# Patient Record
Sex: FEMALE | Race: WHITE | Employment: FULL TIME | URBAN - METROPOLITAN AREA
[De-identification: names, ages, dates, MRNs, and addresses within clinical notes are randomized per-mention and may not be internally consistent; named-entity substitution may affect disease eponyms.]

---

## 2018-12-12 ENCOUNTER — THERAPY VISIT (OUTPATIENT)
Dept: PHYSICAL THERAPY | Facility: CLINIC | Age: 33
End: 2018-12-12
Payer: COMMERCIAL

## 2018-12-12 DIAGNOSIS — S46.812D STRAIN OF LEFT TRAPEZIUS MUSCLE, SUBSEQUENT ENCOUNTER: Primary | ICD-10-CM

## 2018-12-12 PROCEDURE — 97530 THERAPEUTIC ACTIVITIES: CPT | Mod: GP | Performed by: PHYSICAL THERAPIST

## 2018-12-12 PROCEDURE — 97110 THERAPEUTIC EXERCISES: CPT | Mod: GP | Performed by: PHYSICAL THERAPIST

## 2018-12-12 PROCEDURE — 97161 PT EVAL LOW COMPLEX 20 MIN: CPT | Mod: GP | Performed by: PHYSICAL THERAPIST

## 2018-12-12 NOTE — LETTER
[facility] ATHLETIC [facility] PHYSICAL THER  [address]   [place] [address]  [phone]    2018    Re: Cathleen Murray   :   1985  MRN:  [id number]   REFERRING PHYSICIAN:   Shadia Gimenez MD    [facility] ATHLETIC [facility] PHYSICAL THER  Date of Initial Evaluation: 18  Visits:  Rxs Used: 1  Reason for Referral:  Strain of left trapezius muscle, subsequent encounter    EVALUATION SUMMARY  [facility] Athletic [facility] Initial Evaluation -- Cervical  Evaluation Date: 2018  Cathleen Murray is a 33 year old female with a Lt UT condition.   Referral: Ortho  Work mechanical stresses: /hospitality - standing/repetitive   Employment status: normal hours  Leisure mechanical stresses: not a regular exerciser  Functional disability score (NDI):  See flowsheet  VAS score (0-10): 1/10, ranges 0-4/10  Patient goals/expectations:  exer to be able to control the pain    HISTORY:  Present symptoms:  Tight Lt neck, aching base Lt neck into upper ant chest.  Pain quality (sharp/shooting/stabbing/aching/burning/cramping):   See above.  Paresthesia (yes/no):  no  Present since (onset date): about 6 months ago.  MD referral 2018.     Symptoms (improving/unchanging/worsening):  Slightly better.  Symptoms commenced as a result of: unknown   Condition occurred in the following environment:  unknown  Symptoms at onset (neck/arm/forearm/headache): Lt UT  Constant symptoms (neck/arm/forearm/headache): none  Intermittent symptoms (neck/arm/forearm/headache): Lt neck and UT  Symptoms are made worse with the following: Sometimes Bending, Sometimes Sitting, time of day - Always as the day progresses and reaching/lifting overhead or reaching across  Symptoms are made better with the following: sitting straight and stretching out arm  Disturbed sleep (yes/no): Occas difficult finding comfortable position after working  Number of pillows:   Sleeping postures  (prone/sup/side R/L):   0Previous episodes (0/1-5/6-10/11+): o Year of first episode:   Previous history:   Previous treatments: Massage, cupping, acupuncture,           Re: Cathleen Trevor   :   1985    Specific Questions: (as reported by the patient)  Dizziness/Tinnitus/Nausea/Swallowing (pos/neg): neg  Gait/Upper Limbs (normal/abnormal): normal  Medications (nil/NSAIDS/anlag/steroids/anticoag/other):  None  Medical allergies:  PCN  General health (excellent/good/fair/poor):  good  Pertinent medical history:  None  Imaging (None/Xray/MRI/Other):  none  Recent or major surgery (yes/no): none  Night pain (yes/no): none  Accidents (yes/no): no  Unexplained weight loss (yes/no): no  Barriers at home: none  Other red flags: none    EXAMINATION  Posture:   Sitting (good/fair/poor): fair  Standing (good/fair/poor): fair - normal lordosis   Protruded head (yes/no): yes    Wry Neck (right/left/nil):  nil  Relevant (yes/no):     Correction of posture(better/worse/no effect): asher  Other observations:      Neurological:  Motor Deficit:  Decr Triceps 4/5 and Wrist ext 4+/5 Lt   Reflexes:  na  Sensory Deficit:  na   Dural signs:  na    Movement Loss:   [place] Mod Min Nil Pain   Protrusion    X    Flexion    X    Retraction   X  Lt neck   Extension   X  Slight Lt neck   Lateral flexion R    X Stretching Lt neck   Lateral flexion L    X    Rotation R   X  Lt neck stretch   Rotation L    X      Test Movements:   During: produces, abolishes, increases, decreases, no effect, centralizing, peripheralizing  After: better, worse, no better, no worse, no effect, centralized, peripheralized    Pretest symptoms sitting: mild ache Lt UT/neck   Symptoms During Symptoms After ROM increased ROM decreased No Effect   PRO Produces    No Worse         Rep PRO Increases  bilat UT    No Worse      Incr discomfort Lt w/ rotations   RET Increases    No Worse         Rep Ret Incre Lt Neck NW   X   Retr w/ OP incr whole neck NW      Rep RET w/ OP  Increases  Whole neck    No Worse    X  Pain more post neck     RET EXT        Rep RET EXT            Static Tests:   Protrusion:  Lt neck pain  Flexion:  na  Retraction:  decr neck pain  Extension (sitting/prone/supine):  na    Other Tests:    Shld ROM WNL, slight pain active abduction    Provisional Classification:  Derangement - Asymmetrical, unilateral, symptoms above elbow    Principle of Management:  Education:  Posture:  Neutral spine, Use of L-roll, affect of posture on spine and pain producing structures, no couch or recliner, centralisation vs peripheralisation, relastionship betwn neck and shld, and HEP    Equipment provided:  none  Mechanical therapy (Y/N):  y   Extension principle:  Seated Cerv Retraction w/ OP 10x 6 x/day   Lateral principle:    Flexion principle:     Other:      ASSESSMENT/PLAN:    Patient is a 33 year old female with cervical complaints.    Patient has the following significant findings with corresponding treatment plan.                Diagnosis 1:  Lt Trap Strain  Pain -  manual therapy, education, directional preference exercise and home program  Decreased ROM/flexibility - manual therapy, therapeutic exercise and home program  Decreased strength - therapeutic exercise, therapeutic activities and home program  Decreased function - therapeutic activities and home program  Impaired posture - neuro re-education and home program    Therapy Evaluation Codes:   1) History comprised of:   Personal factors that impact the plan of care:      None.    Comorbidity factors that impact the plan of care are:      None.     Medications impacting care: None.    Re: Cathleen Murray   :   1985    2) Examination of Body Systems comprised of:   Body structures and functions that impact the plan of care:      Cervical spine.   Activity limitations that impact the plan of care are:      Cooking, Sitting, Working and reaching.  3) Clinical presentation characteristics  are:   Stable/Uncomplicated.  4) Decision-Making    Low complexity using standardized patient assessment instrument and/or measureable assessment of functional outcome.  Cumulative Therapy Evaluation is: Low complexity.    Previous and current functional limitations:  (See Goal Flow Sheet for this information)    Short term and Long term goals: (See Goal Flow Sheet for this information)   Communication ability:  Patient appears to be able to clearly communicate and understand verbal and written communication and follow directions correctly.  Treatment Explanation - The following has been discussed with the patient:   RX ordered/plan of care  Anticipated outcomes  Possible risks and side effects  This patient would benefit from PT intervention to resume normal activities.   Rehab potential is good.  Frequency:  1 X week, once daily  Duration:  for 6 weeks  Discharge Plan:  Achieve all LTG.  Independent in home treatment program.  Reach maximal therapeutic benefit.      Thank you for your referral.    INQUIRIES  Therapist: Lissette Cerrato PT  [facility]  [address] [facility] [address]  [place] [address]  Phone: [phone]  Fax: [phone]

## 2018-12-12 NOTE — PROGRESS NOTES
[facility] Initial Evaluation -- Cervical    Evaluation Date: December 12, 2018  Cathleen Murray is a 33 year old female with a Lt UT condition.   Referral: Ortho  Work mechanical stresses: /hospitality - standing/repetitive   Employment status: normal hours  Leisure mechanical stresses: not a regular exerciser  Functional disability score (NDI):  See flowsheet  VAS score (0-10): 1/10, ranges 0-4/10  Patient goals/expectations:  exer to be able to control the pain    HISTORY:    Present symptoms:  Tight Lt neck, aching base Lt neck into upper ant chest.  Pain quality (sharp/shooting/stabbing/aching/burning/cramping):   See above.  Paresthesia (yes/no):  no    Present since (onset date): about 6 months ago.  MD referral 12-6-2018.     Symptoms (improving/unchanging/worsening):  Slightly better.    Symptoms commenced as a result of: unknown   Condition occurred in the following environment:  unknown    Symptoms at onset (neck/arm/forearm/headache): Lt UT  Constant symptoms (neck/arm/forearm/headache): none  Intermittent symptoms (neck/arm/forearm/headache): Lt neck and UT    Symptoms are made worse with the following: Sometimes Bending, Sometimes Sitting, time of day - Always as the day progresses and reaching/lifting overhead or reaching across  Symptoms are made better with the following: sitting straight and stretching out arm    Disturbed sleep (yes/no): Occas difficult finding comfortable position after working  Number of pillows:   Sleeping postures (prone/sup/side R/L):     0Previous episodes (0/1-5/6-10/11+): o Year of first episode:     Previous history:   Previous treatments: Massage, cupping, acupuncture,     Specific Questions: (as reported by the patient)  Dizziness/Tinnitus/Nausea/Swallowing (pos/neg): neg  Gait/Upper Limbs (normal/abnormal): normal  Medications (nil/NSAIDS/anlag/steroids/anticoag/other):  None  Medical allergies:  PCN  General health (excellent/good/fair/poor):   good  Pertinent medical history:  None  Imaging (None/Xray/MRI/Other):  none  Recent or major surgery (yes/no): none  Night pain (yes/no): none  Accidents (yes/no): no  Unexplained weight loss (yes/no): no  Barriers at home: none  Other red flags: none    EXAMINATION    Posture:   Sitting (good/fair/poor): fair  Standing (good/fair/poor): fair - normal lordosis     Protruded head (yes/no): yes    Wry Neck (right/left/nil):  nil  Relevant (yes/no):       Correction of posture(better/worse/no effect): asher  Other observations:      Neurological:    Motor Deficit:  Decr Triceps 4/5 and Wrist ext 4+/5 Lt   Reflexes:  na  Sensory Deficit:  na   Dural signs:  na    Movement Loss:   [place] Mod Min Nil Pain   Protrusion    X    Flexion    X    Retraction   X  Lt neck   Extension   X  Slight Lt neck   Lateral flexion R    X Stretching Lt neck   Lateral flexion L    X    Rotation R   X  Lt neck stretch   Rotation L    X      Test Movements:   During: produces, abolishes, increases, decreases, no effect, centralizing, peripheralizing  After: better, worse, no better, no worse, no effect, centralized, peripheralized    Pretest symptoms sitting: mild ache Lt UT/neck   Symptoms During Symptoms After ROM increased ROM decreased No Effect   PRO Produces    No Worse         Rep PRO Increases  bilat UT    No Worse      Incr discomfort Lt w/ rotations   RET Increases    No Worse         Rep Ret Incre Lt Neck NW   X   Retr w/ OP incr whole neck NW      Rep RET w/ OP Increases  Whole neck    No Worse    X  Pain more post neck     RET EXT        Rep RET EXT            Static Tests:   Protrusion:  Lt neck pain  Flexion:  na  Retraction:  decr neck pain  Extension (sitting/prone/supine):  na    Other Tests:    Shld ROM WNL, slight pain active abduction    Provisional Classification:  Derangement - Asymmetrical, unilateral, symptoms above elbow    Principle of Management:  Education:  Posture:  Neutral spine, Use of L-roll, affect of posture  on spine and pain producing structures, no couch or recliner, centralisation vs peripheralisation, relastionship betwn neck and shld, and HEP    Equipment provided:  none  Mechanical therapy (Y/N):  y   Extension principle:  Seated Cerv Retraction w/ OP 10x 6 x/day   Lateral principle:    Flexion principle:     Other:      ASSESSMENT/PLAN:    Patient is a 33 year old female with cervical complaints.    Patient has the following significant findings with corresponding treatment plan.                Diagnosis 1:  Lt Trap Strain  Pain -  manual therapy, education, directional preference exercise and home program  Decreased ROM/flexibility - manual therapy, therapeutic exercise and home program  Decreased strength - therapeutic exercise, therapeutic activities and home program  Decreased function - therapeutic activities and home program  Impaired posture - neuro re-education and home program    Therapy Evaluation Codes:   1) History comprised of:   Personal factors that impact the plan of care:      None.    Comorbidity factors that impact the plan of care are:      None.     Medications impacting care: None.  2) Examination of Body Systems comprised of:   Body structures and functions that impact the plan of care:      Cervical spine.   Activity limitations that impact the plan of care are:      Cooking, Sitting, Working and reaching.  3) Clinical presentation characteristics are:   Stable/Uncomplicated.  4) Decision-Making    Low complexity using standardized patient assessment instrument and/or measureable assessment of functional outcome.  Cumulative Therapy Evaluation is: Low complexity.    Previous and current functional limitations:  (See Goal Flow Sheet for this information)    Short term and Long term goals: (See Goal Flow Sheet for this information)     Communication ability:  Patient appears to be able to clearly communicate and understand verbal and written communication and follow directions  correctly.  Treatment Explanation - The following has been discussed with the patient:   RX ordered/plan of care  Anticipated outcomes  Possible risks and side effects  This patient would benefit from PT intervention to resume normal activities.   Rehab potential is good.    Frequency:  1 X week, once daily  Duration:  for 6 weeks  Discharge Plan:  Achieve all LTG.  Independent in home treatment program.  Reach maximal therapeutic benefit.    Please refer to the daily flowsheet for treatment today, total treatment time and time spent performing 1:1 timed codes.

## 2018-12-17 ENCOUNTER — THERAPY VISIT (OUTPATIENT)
Dept: PHYSICAL THERAPY | Facility: CLINIC | Age: 33
End: 2018-12-17
Payer: COMMERCIAL

## 2018-12-17 DIAGNOSIS — M54.2 NECK PAIN: Primary | ICD-10-CM

## 2018-12-17 PROCEDURE — 97530 THERAPEUTIC ACTIVITIES: CPT | Mod: GP | Performed by: PHYSICAL THERAPY ASSISTANT

## 2018-12-17 PROCEDURE — 97110 THERAPEUTIC EXERCISES: CPT | Mod: GP | Performed by: PHYSICAL THERAPY ASSISTANT

## 2019-09-18 ENCOUNTER — OFFICE VISIT (OUTPATIENT)
Dept: INTERNAL MEDICINE | Facility: CLINIC | Age: 34
End: 2019-09-18
Attending: INTERNAL MEDICINE
Payer: COMMERCIAL

## 2019-09-18 VITALS
BODY MASS INDEX: 32.24 KG/M2 | HEART RATE: 73 BPM | WEIGHT: 217.7 LBS | SYSTOLIC BLOOD PRESSURE: 139 MMHG | HEIGHT: 69 IN | DIASTOLIC BLOOD PRESSURE: 100 MMHG

## 2019-09-18 DIAGNOSIS — F41.1 GENERALIZED ANXIETY DISORDER: ICD-10-CM

## 2019-09-18 DIAGNOSIS — Z11.3 SCREENING EXAMINATION FOR SEXUALLY TRANSMITTED DISEASE: ICD-10-CM

## 2019-09-18 DIAGNOSIS — Z23 NEED FOR TDAP VACCINATION: ICD-10-CM

## 2019-09-18 DIAGNOSIS — M72.2 PLANTAR FASCIITIS: ICD-10-CM

## 2019-09-18 DIAGNOSIS — F41.9 ANXIETY: Primary | ICD-10-CM

## 2019-09-18 LAB
ERYTHROCYTE [DISTWIDTH] IN BLOOD BY AUTOMATED COUNT: 12.5 % (ref 10–15)
FERRITIN SERPL-MCNC: 58 NG/ML (ref 12–150)
HCT VFR BLD AUTO: 44.1 % (ref 35–47)
HGB BLD-MCNC: 14.5 G/DL (ref 11.7–15.7)
MCH RBC QN AUTO: 29.4 PG (ref 26.5–33)
MCHC RBC AUTO-ENTMCNC: 32.9 G/DL (ref 31.5–36.5)
MCV RBC AUTO: 90 FL (ref 78–100)
PLATELET # BLD AUTO: 314 10E9/L (ref 150–450)
RBC # BLD AUTO: 4.93 10E12/L (ref 3.8–5.2)
TSH SERPL DL<=0.005 MIU/L-ACNC: 0.67 MU/L (ref 0.4–4)
WBC # BLD AUTO: 7.6 10E9/L (ref 4–11)

## 2019-09-18 PROCEDURE — 82728 ASSAY OF FERRITIN: CPT | Performed by: INTERNAL MEDICINE

## 2019-09-18 PROCEDURE — 85027 COMPLETE CBC AUTOMATED: CPT | Performed by: INTERNAL MEDICINE

## 2019-09-18 PROCEDURE — 90686 IIV4 VACC NO PRSV 0.5 ML IM: CPT | Mod: ZF

## 2019-09-18 PROCEDURE — 87389 HIV-1 AG W/HIV-1&-2 AB AG IA: CPT | Performed by: INTERNAL MEDICINE

## 2019-09-18 PROCEDURE — G0463 HOSPITAL OUTPT CLINIC VISIT: HCPCS | Mod: 25,ZF

## 2019-09-18 PROCEDURE — 90471 IMMUNIZATION ADMIN: CPT | Mod: ZF

## 2019-09-18 PROCEDURE — 90715 TDAP VACCINE 7 YRS/> IM: CPT | Mod: ZF

## 2019-09-18 PROCEDURE — 36415 COLL VENOUS BLD VENIPUNCTURE: CPT | Performed by: INTERNAL MEDICINE

## 2019-09-18 PROCEDURE — 84443 ASSAY THYROID STIM HORMONE: CPT | Performed by: INTERNAL MEDICINE

## 2019-09-18 PROCEDURE — G0008 ADMIN INFLUENZA VIRUS VAC: HCPCS | Mod: ZF

## 2019-09-18 PROCEDURE — 25000128 H RX IP 250 OP 636: Mod: ZF

## 2019-09-18 RX ORDER — HYDROXYZINE HYDROCHLORIDE 10 MG/1
10 TABLET, FILM COATED ORAL 3 TIMES DAILY PRN
Qty: 30 TABLET | Refills: 3 | Status: SHIPPED | OUTPATIENT
Start: 2019-09-18

## 2019-09-18 ASSESSMENT — ANXIETY QUESTIONNAIRES
1. FEELING NERVOUS, ANXIOUS, OR ON EDGE: NEARLY EVERY DAY
7. FEELING AFRAID AS IF SOMETHING AWFUL MIGHT HAPPEN: NOT AT ALL
5. BEING SO RESTLESS THAT IT IS HARD TO SIT STILL: NOT AT ALL
6. BECOMING EASILY ANNOYED OR IRRITABLE: MORE THAN HALF THE DAYS
GAD7 TOTAL SCORE: 10
2. NOT BEING ABLE TO STOP OR CONTROL WORRYING: SEVERAL DAYS
3. WORRYING TOO MUCH ABOUT DIFFERENT THINGS: MORE THAN HALF THE DAYS

## 2019-09-18 ASSESSMENT — PATIENT HEALTH QUESTIONNAIRE - PHQ9
SUM OF ALL RESPONSES TO PHQ QUESTIONS 1-9: 9
5. POOR APPETITE OR OVEREATING: MORE THAN HALF THE DAYS

## 2019-09-18 ASSESSMENT — MIFFLIN-ST. JEOR: SCORE: 1756.86

## 2019-09-18 NOTE — LETTER
9/25/2019         Cathleen Murray   [address] [facility] [address]        Dear Ms. Murray:    Your labs were reviewed by Ashley Padgett MD and are within acceptable ranges.  No changes are recommended      Results for orders placed or performed in visit on 09/18/19   TSH with free T4 reflex   Result Value Ref Range    TSH 0.67 0.40 - 4.00 mU/L   HIV Antigen Antibody Combo   Result Value Ref Range    HIV Antigen Antibody Combo Nonreactive NR^Nonreactive       CBC with Platelets   Result Value Ref Range    WBC 7.6 4.0 - 11.0 10e9/L    RBC Count 4.93 3.8 - 5.2 10e12/L    Hemoglobin 14.5 11.7 - 15.7 g/dL    Hematocrit 44.1 35.0 - 47.0 %    MCV 90 78 - 100 fl    MCH 29.4 26.5 - 33.0 pg    MCHC 32.9 31.5 - 36.5 g/dL    RDW 12.5 10.0 - 15.0 %    Platelet Count 314 150 - 450 10e9/L   Ferritin   Result Value Ref Range    Ferritin 58 12 - 150 ng/mL         Please note that test explanations are brief and do not reflect all diagnostic uses.  If you have any questions or concerns, please call the clinic at [phone].      Sincerely,      Radha Leonard LPN sent on behalf of  Ashley Padgett MD

## 2019-09-18 NOTE — PROGRESS NOTES
"Office Visit    CC: Anxiety and diarrhoea    HPI:  Moved to [place] 7 yrs ago, previously on medication for anxiety for 2 years-buspirone (was fine, not amazing). Tried another (name unknown) which was good but insurance didn't cover. Doesn't remember other one tried. Previously acupuncture but really got bad 2 weeks ago. She has a lot of social anxiety, claustrophobic. Some dissociation while working due to anxiety. Sometimes panicky and get heart palpitations. Trying CBD oil as well, but anxiety has reached past the point of improvement with conservative measures.  Works as a  at [employer]. Has had to miss four days of work due to anxiety, which she says is \"not acceptable\".  No SI/HI.      In regards to the diarrhea, it is generally only in the morning, some urgency.  No incontinence, no saddle anaesthesia.   Bread ok, but anything fatty, fruit/veg causes problems. 2 years.  She just started probiotics, feeling better.    Camping-yes. No bloating or gas  No signs of malabsorption, such as weight loss, foul smelling stools that are difficult to flush  Travel in last two years- [place]  Blood in stool-no  Melaena-no  Sick contacts-no  Fever -no  Cramping-no  No FHx Crohns or UC, no FHx autoimmune dz other than T1DM  No nausea or vomiting  Every day, would be surprised if doesn't have  No changes with emotions/anxiety  No constipation at other times of day, other BMs mostly normal  Abdo pain-occasional cramping on bad days    LMP-two weeks ago. IUD--needs replaced (year 10) copper. 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 Dad has it, she was tested.    Some swelling ankles, worse after shift as . Tender. Occ dizziness emiliano at work, emiliano if hasn't eaten    PMH-anxiety.  Past surgical history: [place] teeth extraction, no problems with anaesthesia    FHx: Both types DM in family      Tdap as teenager, tetanus in college > 10 years ago    Abnormal pap smear >10 years, never had colposcopy or " "treatment, next one normal (recheck in six months). Has copper IUD for contraception. Menses without excessive bleeding or pain.    Had gardasil in 20s    Wears good shoes. Has plantar fasciitis which has been getting worse despite OTC inserts.        O/E:  BP (!) 139/100   Pulse 73   Ht 1.753 m (5' 9\")   Wt 98.7 kg (217 lb 11.2 oz)   BMI 32.15 kg/m    Exam:  Constitutional: healthy, alert and no distress. Occasionally tearful  Head: Normocephalic. No masses, lesions, tenderness or abnormalities  Neck: Neck supple. No adenopathy. Thyroid symmetric, normal size, Carotids without bruits.  Cardiovascular: negative. RRR. No murmurs, clicks gallops or rub, No oedema or JVD.  Respiratory: negative, Percussion normal. Good diaphragmatic excursion. Lungs clear  Gastrointestinal: Abdomen soft, non-tender. BS normal. No masses, organomegaly  : Deferred  Musculoskeletal: extremities normal- no gross deformities noted, gait normal and normal muscle tone  Skin: no suspicious lesions or rashes  Neurologic: Gait normal. Speech fluent, answering questions appropriately  Psychiatric: mentation appears normal and affect normal/bright  Hematologic/Lymphatic/Immunologic: Normal cervical lymph nodes        Assessment and Plan:    #. Anxiety and depression  -TSH  -Psychology referral  -Fluoxetine 20mg  - Hydroxyzine PRN    #. RHM  - Influenza vaccination  - Tdap  - HIV  - CBC and ferritin    #. Womens health  - Return for pap smear and removal/re-insertion of IUD (to schedule with gynecology)    #. Plantar fasciitis  - Podiatry referral      The patient was seen and discussed with the attending physician, Dr. Padgett, who agrees with my assessment and plan.    Amber MALDONADO  Internal Medicine PGY3    I was present during the visit and the patient was seen and examined by me in conjunction with the resident.  I discussed the pertinent history, exam, results and plan with the resident and agree with the documentation above with " the following exceptions: none.    Ashley Padgett MD

## 2019-09-18 NOTE — LETTER
"9/18/2019       RE: Cathleen Murray  [address] [facility] [address]     Dear Colleague,    Thank you for referring your patient, Cathleen Murray, to [facility]  at [facility]. Please see a copy of my visit note below.    Office Visit    CC: Anxiety and diarrhoea    HPI:  Moved to [place] 7 yrs ago, previously on medication for anxiety for 2 years-buspirone (was fine, not amazing). Tried another (name unknown) which was good but insurance didn't cover. Doesn't remember other one tried. Previously acupuncture but really got bad 2 weeks ago. She has a lot of social anxiety, claustrophobic. Some dissociation while working due to anxiety. Sometimes panicky and get heart palpitations. Trying CBD oil as well, but anxiety has reached past the point of improvement with conservative measures.  Works as a  at [employer]. Has had to miss four days of work due to anxiety, which she says is \"not acceptable\".  No SI/HI.    In regards to the diarrhea, it is generally only in the morning, some urgency.  No incontinence, no saddle anaesthesia.   Bread ok, but anything fatty, fruit/veg causes problems. 2 years.  She just started probiotics, feeling better.    Camping-yes. No bloating or gas  No signs of malabsorption, such as weight loss, foul smelling stools that are difficult to flush  Travel in last two years- [place]  Blood in stool-no  Melaena-no  Sick contacts-no  Fever -no  Cramping-no  No FHx Crohns or UC, no FHx autoimmune dz other than T1DM  No nausea or vomiting  Every day, would be surprised if doesn't have  No changes with emotions/anxiety  No constipation at other times of day, other BMs mostly normal  Abdo pain-occasional cramping on bad days    LMP-two weeks ago. IUD--needs replaced (year 10) copper. 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 Dad has it, she was tested.    Some swelling ankles, worse after shift as . Tender. Occ " "dizziness emiliano at work, emiliano if hasn't eaten    PMH-anxiety.  Past surgical history: [place] teeth extraction, no problems with anaesthesia    FHx: Both types DM in family    Tdap as teenager, tetanus in college > 10 years ago    Abnormal pap smear >10 years, never had colposcopy or treatment, next one normal (recheck in six months). Has copper IUD for contraception. Menses without excessive bleeding or pain.    Had gardasil in 20s    Wears good shoes. Has plantar fasciitis which has been getting worse despite OTC inserts.    O/E:  BP (!) 139/100   Pulse 73   Ht 1.753 m (5' 9\")   Wt 98.7 kg (217 lb 11.2 oz)   BMI 32.15 kg/m     Exam:  Constitutional: healthy, alert and no distress. Occasionally tearful  Head: Normocephalic. No masses, lesions, tenderness or abnormalities  Neck: Neck supple. No adenopathy. Thyroid symmetric, normal size, Carotids without bruits.  Cardiovascular: negative. RRR. No murmurs, clicks gallops or rub, No oedema or JVD.  Respiratory: negative, Percussion normal. Good diaphragmatic excursion. Lungs clear  Gastrointestinal: Abdomen soft, non-tender. BS normal. No masses, organomegaly  : Deferred  Musculoskeletal: extremities normal- no gross deformities noted, gait normal and normal muscle tone  Skin: no suspicious lesions or rashes  Neurologic: Gait normal. Speech fluent, answering questions appropriately  Psychiatric: mentation appears normal and affect normal/bright  Hematologic/Lymphatic/Immunologic: Normal cervical lymph nodes    Assessment and Plan:    #. Anxiety and depression  -TSH  -Psychology referral  -Fluoxetine 20mg  - Hydroxyzine PRN    #. RHM  - Influenza vaccination  - Tdap  - HIV  - CBC and ferritin    #. Womens health  - Return for pap smear and removal/re-insertion of IUD (to schedule with gynecology)    #. Plantar fasciitis  - Podiatry referral    The patient was seen and discussed with the attending physician, Dr. Padgett, who agrees with my assessment and " plan.    Amber MALDONADO  Internal Medicine PGY3    I was present during the visit and the patient was seen and examined by me in conjunction with the resident.  I discussed the pertinent history, exam, results and plan with the resident and agree with the documentation above with the following exceptions: none.    Ashley Padgett MD

## 2019-09-18 NOTE — LETTER
9/25/2019         Cathleen Murray   [address] [facility] [address]        Dear Ms. Murray:    Your labs were reviewed by Ashley Padgett MD and are within acceptable ranges.  No changes are recommended.    Results for orders placed or performed in visit on 09/18/19   TSH with free T4 reflex   Result Value Ref Range    TSH 0.67 0.40 - 4.00 mU/L   HIV Antigen Antibody Combo   Result Value Ref Range    HIV Antigen Antibody Combo Nonreactive NR^Nonreactive       CBC with Platelets   Result Value Ref Range    WBC 7.6 4.0 - 11.0 10e9/L    RBC Count 4.93 3.8 - 5.2 10e12/L    Hemoglobin 14.5 11.7 - 15.7 g/dL    Hematocrit 44.1 35.0 - 47.0 %    MCV 90 78 - 100 fl    MCH 29.4 26.5 - 33.0 pg    MCHC 32.9 31.5 - 36.5 g/dL    RDW 12.5 10.0 - 15.0 %    Platelet Count 314 150 - 450 10e9/L   Ferritin   Result Value Ref Range    Ferritin 58 12 - 150 ng/mL         Please note that test explanations are brief and do not reflect all diagnostic uses.  If you have any questions or concerns, please call the clinic at [phone].      Sincerely,      Radha Leonard LPN sent on behalf of  Ashley Padgett MD

## 2019-09-19 LAB — HIV 1+2 AB+HIV1 P24 AG SERPL QL IA: NONREACTIVE

## 2019-09-19 ASSESSMENT — ANXIETY QUESTIONNAIRES: GAD7 TOTAL SCORE: 10

## 2019-10-30 ENCOUNTER — OFFICE VISIT (OUTPATIENT)
Dept: INTERNAL MEDICINE | Facility: CLINIC | Age: 34
End: 2019-10-30
Attending: INTERNAL MEDICINE
Payer: COMMERCIAL

## 2019-10-30 VITALS
BODY MASS INDEX: 31.75 KG/M2 | WEIGHT: 215 LBS | HEART RATE: 80 BPM | DIASTOLIC BLOOD PRESSURE: 112 MMHG | SYSTOLIC BLOOD PRESSURE: 146 MMHG

## 2019-10-30 DIAGNOSIS — R03.0 ELEVATED BLOOD PRESSURE READING WITHOUT DIAGNOSIS OF HYPERTENSION: Primary | ICD-10-CM

## 2019-10-30 DIAGNOSIS — F41.1 GENERALIZED ANXIETY DISORDER: ICD-10-CM

## 2019-10-30 PROCEDURE — G0463 HOSPITAL OUTPT CLINIC VISIT: HCPCS | Mod: ZF

## 2019-10-30 ASSESSMENT — PAIN SCALES - GENERAL: PAINLEVEL: NO PAIN (0)

## 2019-10-30 ASSESSMENT — PATIENT HEALTH QUESTIONNAIRE - PHQ9
SUM OF ALL RESPONSES TO PHQ QUESTIONS 1-9: 10
5. POOR APPETITE OR OVEREATING: SEVERAL DAYS

## 2019-10-30 ASSESSMENT — ANXIETY QUESTIONNAIRES
3. WORRYING TOO MUCH ABOUT DIFFERENT THINGS: SEVERAL DAYS
7. FEELING AFRAID AS IF SOMETHING AWFUL MIGHT HAPPEN: NOT AT ALL
6. BECOMING EASILY ANNOYED OR IRRITABLE: SEVERAL DAYS
5. BEING SO RESTLESS THAT IT IS HARD TO SIT STILL: NOT AT ALL
2. NOT BEING ABLE TO STOP OR CONTROL WORRYING: SEVERAL DAYS
1. FEELING NERVOUS, ANXIOUS, OR ON EDGE: SEVERAL DAYS
GAD7 TOTAL SCORE: 5

## 2019-10-30 NOTE — PROGRESS NOTES
HPI  Patient is here for follow-up on anxiety and depression.  She reports that her mood has improved.  She has not establish care with psychology this time, however, she is still looking for a therapist.    Review of Systems     Constitutional:  Negative for fever and fatigue.   Respiratory:   Negative for cough and dyspnea on exertion.    Cardiovascular:  Negative for chest pain, dyspnea on exertion and edema.   Gastrointestinal:  Negative for nausea, abdominal pain, diarrhea, constipation and melena.   Skin:  Negative for itching and rash.   Endo/Heme:  Negative for anemia, swollen glands and bruises/bleeds easily.   Psychiatric/Behavioral:  Positive for depression and mood swings. Negative for decreased concentration and panic attacks.    Endocrine:  Negative for altered temperature regulation, polyphagia, polydipsia, unwanted hair growth and change in facial hair.    Current Outpatient Medications   Medication     FLUoxetine (PROZAC) 20 MG capsule     hydrOXYzine (ATARAX) 10 MG tablet     No current facility-administered medications for this visit.      Vitals:    10/30/19 1357 10/30/19 1358 10/30/19 1359 10/30/19 1400   BP: (!) 152/115 (!) 145/110 (!) 142/111 (!) 146/112   Pulse: 80 80 80    Weight: 97.5 kg (215 lb)          Physical Exam  Vitals signs and nursing note reviewed.   Constitutional:       Appearance: Normal appearance.   HENT:      Head: Normocephalic and atraumatic.      Mouth/Throat:      Mouth: Mucous membranes are moist.   Eyes:      Pupils: Pupils are equal, round, and reactive to light.   Neck:      Musculoskeletal: Normal range of motion.   Cardiovascular:      Rate and Rhythm: Normal rate.   Pulmonary:      Effort: Pulmonary effort is normal.   Musculoskeletal:         General: No edema.   Neurological:      General: No focal deficit present.      Mental Status: She is alert and oriented to person, place, and time.   Psychiatric:         Mood and Affect: Mood normal.         Behavior:  Behavior normal.         Thought Content: Thought content normal.         Judgement: Judgment normal.         Assessment and Plan:  Cathleen was seen today for follow up.    Diagnoses and all orders for this visit:    Elevated blood pressure reading without diagnosis of hypertension.  Blood pressure is significantly elevated today.  Recommend further evaluation with 24-hour blood pressure study as patient may have whitecoat hypertension.  Patient will follow-up upon completion of the study for discussion and possible initiation of therapy.  -     24 Hour Blood Pressure Monitor - Adult; Future      Generalized anxiety disorder.  Patient reports that her symptoms have improved.  Recommend to continue with current therapy without changes.  Patient was also encouraged to consider counseling.    Total time spent 25 minutes.  More than 50% of the time spent with Ms. Murray on counseling / coordinating her care    Ashley Padgett MD

## 2019-10-30 NOTE — LETTER
10/30/2019       RE: Cathleen Murray  [address] [facility] [address]     Dear Colleague,    Thank you for referring your patient, Cathleen Murray, to [facility] Please see a copy of my visit note below.    HPI  Patient is here for follow-up on anxiety and depression.  She reports that her mood has improved.  She has not establish care with psychology this time, however, she is still looking for a therapist.    Review of Systems     Constitutional:  Negative for fever and fatigue.   Respiratory:   Negative for cough and dyspnea on exertion.    Cardiovascular:  Negative for chest pain, dyspnea on exertion and edema.   Gastrointestinal:  Negative for nausea, abdominal pain, diarrhea, constipation and melena.   Skin:  Negative for itching and rash.   Endo/Heme:  Negative for anemia, swollen glands and bruises/bleeds easily.   Psychiatric/Behavioral:  Positive for depression and mood swings. Negative for decreased concentration and panic attacks.    Endocrine:  Negative for altered temperature regulation, polyphagia, polydipsia, unwanted hair growth and change in facial hair.    Current Outpatient Medications   Medication     FLUoxetine (PROZAC) 20 MG capsule     hydrOXYzine (ATARAX) 10 MG tablet     No current facility-administered medications for this visit.      Vitals:    10/30/19 1357 10/30/19 1358 10/30/19 1359 10/30/19 1400   BP: (!) 152/115 (!) 145/110 (!) 142/111 (!) 146/112   Pulse: 80 80 80    Weight: 97.5 kg (215 lb)          Physical Exam  Vitals signs and nursing note reviewed.   Constitutional:       Appearance: Normal appearance.   HENT:      Head: Normocephalic and atraumatic.      Mouth/Throat:      Mouth: Mucous membranes are moist.   Eyes:      Pupils: Pupils are equal, round, and reactive to light.   Neck:      Musculoskeletal: Normal range of motion.   Cardiovascular:      Rate and Rhythm: Normal rate.   Pulmonary:       Effort: Pulmonary effort is normal.   Musculoskeletal:         General: No edema.   Neurological:      General: No focal deficit present.      Mental Status: She is alert and oriented to person, place, and time.   Psychiatric:         Mood and Affect: Mood normal.         Behavior: Behavior normal.         Thought Content: Thought content normal.         Judgement: Judgment normal.         Assessment and Plan:  Cathleen was seen today for follow up.    Diagnoses and all orders for this visit:    Elevated blood pressure reading without diagnosis of hypertension.  Blood pressure is significantly elevated today.  Recommend further evaluation with 24-hour blood pressure study as patient may have whitecoat hypertension.  Patient will follow-up upon completion of the study for discussion and possible initiation of therapy.  -     24 Hour Blood Pressure Monitor - Adult; Future      Generalized anxiety disorder.  Patient reports that her symptoms have improved.  Recommend to continue with current therapy without changes.  Patient was also encouraged to consider counseling.    Total time spent 25 minutes.  More than 50% of the time spent with Ms. Murray on counseling / coordinating her care    Ashley Padgett MD

## 2019-10-31 ASSESSMENT — ANXIETY QUESTIONNAIRES: GAD7 TOTAL SCORE: 5

## 2019-11-02 PROBLEM — F41.1 GENERALIZED ANXIETY DISORDER: Status: ACTIVE | Noted: 2019-11-02

## 2019-11-02 ASSESSMENT — ENCOUNTER SYMPTOMS
DEPRESSION: 1
DIARRHEA: 0
CONSTIPATION: 0
ALTERED TEMPERATURE REGULATION: 0
NAUSEA: 0
FEVER: 0
BRUISES/BLEEDS EASILY: 0
NERVOUS/ANXIOUS: 1
DYSPNEA ON EXERTION: 0
PANIC: 0
INSOMNIA: 0
SWOLLEN GLANDS: 0
DECREASED CONCENTRATION: 0
COUGH: 0
FATIGUE: 0
POLYPHAGIA: 0
ABDOMINAL PAIN: 0
POLYDIPSIA: 0

## 2019-11-04 ENCOUNTER — OFFICE VISIT (OUTPATIENT)
Dept: OBGYN | Facility: CLINIC | Age: 34
End: 2019-11-04
Attending: OBSTETRICS & GYNECOLOGY
Payer: COMMERCIAL

## 2019-11-04 ENCOUNTER — HOSPITAL ENCOUNTER (OUTPATIENT)
Dept: CARDIOLOGY | Facility: CLINIC | Age: 34
Discharge: HOME | End: 2019-11-04
Attending: INTERNAL MEDICINE | Admitting: INTERNAL MEDICINE
Payer: COMMERCIAL

## 2019-11-04 VITALS
BODY MASS INDEX: 31.84 KG/M2 | SYSTOLIC BLOOD PRESSURE: 131 MMHG | HEIGHT: 69 IN | WEIGHT: 215 LBS | HEART RATE: 92 BPM | DIASTOLIC BLOOD PRESSURE: 98 MMHG

## 2019-11-04 DIAGNOSIS — Z12.4 CERVICAL CANCER SCREENING: ICD-10-CM

## 2019-11-04 DIAGNOSIS — Z00.00 VISIT FOR PREVENTIVE HEALTH EXAMINATION: Primary | ICD-10-CM

## 2019-11-04 DIAGNOSIS — Z30.432 ENCOUNTER FOR IUD REMOVAL: ICD-10-CM

## 2019-11-04 DIAGNOSIS — Z30.430 ENCOUNTER FOR IUD INSERTION: ICD-10-CM

## 2019-11-04 DIAGNOSIS — R03.0 ELEVATED BLOOD PRESSURE READING WITHOUT DIAGNOSIS OF HYPERTENSION: ICD-10-CM

## 2019-11-04 PROCEDURE — 25000125 ZZHC RX 250: Mod: ZF | Performed by: OBSTETRICS & GYNECOLOGY

## 2019-11-04 PROCEDURE — 58301 REMOVE INTRAUTERINE DEVICE: CPT

## 2019-11-04 PROCEDURE — G0463 HOSPITAL OUTPT CLINIC VISIT: HCPCS | Mod: ZF

## 2019-11-04 PROCEDURE — 58300 INSERT INTRAUTERINE DEVICE: CPT

## 2019-11-04 PROCEDURE — G0145 SCR C/V CYTO,THINLAYER,RESCR: HCPCS | Performed by: OBSTETRICS & GYNECOLOGY

## 2019-11-04 PROCEDURE — 93790 AMBL BP MNTR W/SW I&R: CPT | Performed by: INTERNAL MEDICINE

## 2019-11-04 PROCEDURE — G0463 HOSPITAL OUTPT CLINIC VISIT: HCPCS | Mod: 25

## 2019-11-04 PROCEDURE — 93788 AMBL BP MNTR W/SW A/R: CPT

## 2019-11-04 PROCEDURE — 87624 HPV HI-RISK TYP POOLED RSLT: CPT | Performed by: OBSTETRICS & GYNECOLOGY

## 2019-11-04 RX ADMIN — LEVONORGESTREL 20 MCG: 52 INTRAUTERINE DEVICE INTRAUTERINE at 14:34

## 2019-11-04 ASSESSMENT — ANXIETY QUESTIONNAIRES
6. BECOMING EASILY ANNOYED OR IRRITABLE: SEVERAL DAYS
7. FEELING AFRAID AS IF SOMETHING AWFUL MIGHT HAPPEN: NOT AT ALL
3. WORRYING TOO MUCH ABOUT DIFFERENT THINGS: SEVERAL DAYS
1. FEELING NERVOUS, ANXIOUS, OR ON EDGE: MORE THAN HALF THE DAYS
GAD7 TOTAL SCORE: 6
2. NOT BEING ABLE TO STOP OR CONTROL WORRYING: SEVERAL DAYS
5. BEING SO RESTLESS THAT IT IS HARD TO SIT STILL: NOT AT ALL

## 2019-11-04 ASSESSMENT — PAIN SCALES - GENERAL: PAINLEVEL: NO PAIN (0)

## 2019-11-04 ASSESSMENT — PATIENT HEALTH QUESTIONNAIRE - PHQ9
SUM OF ALL RESPONSES TO PHQ QUESTIONS 1-9: 9
5. POOR APPETITE OR OVEREATING: SEVERAL DAYS

## 2019-11-04 ASSESSMENT — MIFFLIN-ST. JEOR: SCORE: 1739.61

## 2019-11-04 NOTE — PROGRESS NOTES
SUBJECTIVE:    Is a pregnancy test required: No.  Was a consent obtained?  Yes    Subjective: Cathleen Murray is a 34 year old  presents for IUD and desires Mirena type IUD.  She requests removal of the IUD because the IUD effectiveness has   She has had the Paragard for 10 years; she has a thrombophilia disorder but doesn't know which one and was told the paragard was better.  Apparently Dr. Padgett told her the Mirena was ok.  Patient has been given the opportunity to ask questions about all forms of birth control, including all options appropriate for Cathleen Murray.  Cathleen Murray understands she may have the IUD removed at any time. IUD should be removed by a health care provider and the current IUD will be removed today.    The entire removal and insertion procedure was reviewed with the patient, including care after placement.    Today's PHQ-2 Score:   PHQ-2 (  [employer]) 2019   Q1: Little interest or pleasure in doing things 1   Q2: Feeling down, depressed or hopeless 1   PHQ-2 Score 2       PROCEDURE:    Premedicated with ibuprofen. intracervical block at tenaculum site  A speculum exam was performed and the cervix was visualized. The IUD string was visualized. Using ring forceps, the string  was grasped and the IUD removed intact by the [facility] resident; Dr. Lurdes Gold.     I then proceeded for IUD insertion. Under sterile technique, cervix was visualized with speculum and prepped with Betadine solution swab x 3. Tenaculum was placed for stability. The uterus was gently straightened and sounded to 7.0 cm. IUD prepared for placement, and IUD inserted according to 's instructions without difficulty or significant ressitance, and deployed at the fundus. The strings were visualized and trimmed to 3.0 cm from the external os. Tenaculum was removed and hemostasis noted. Speculum removed.  Patient tolerated procedure well.    Lot # [id number]  Exp: 2022    EBL: minimal    Complications:  none      POST PROCEDURE:    Advised to call for any fever, for prolonged or severe pain or bleeding, abnormal vaginal dischage, or unable to palpate strings. She was advised to use pain medications (ibuprofen) as needed for mild to moderate pain. Advised to follow-up in clinic in 8 weeks for IUD string check if unable to find strings or as directed by provider.     Sowmya Sherman MD    I was present for the entire procedure. I personally replaced the IUD.

## 2019-11-04 NOTE — LETTER
"2019       RE: Cathleen Murray  [address] [facility] [address]     Dear Colleague,    Thank you for referring your patient, Cathleen Murray, to the [facility] at [facility]. Please see a copy of my visit note below.      Progress Note    SUBJECTIVE:  Cathleen Murray is an 34 year old, , who requests an Annual Preventive Exam.     She is due for her PAP. Last was about 5 years ago. One PAP with ASCUS in her 20s with subsequent normal testing. No current concerns. No change in vaginal discharge. Monogamous with male partner of 5 years. Her periods are regular, every 30 days, lasting 5 days with two days of heavy bleeding (she has to change her menstrual cup twice on heavy days). Menarche was at 10.    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 She does not remember the name. No personal history of clots. Her father had a PE and the genetic abnormality was found on work-up; she was subsequently screening. As a result, she had a Paraguard placed (provider was uncomfortable with hormonal contraception), which is now 10 years old. She is interested in getting a Mirena, in hopes of a lighter period. No plans to have children.    Menstrual History:  Menstrual History 10/30/2019   LAST MENSTRUAL PERIOD 10/23/2019       Last  No results found for: PAP  History of abnormal Pap smear: YES - updated in Problem List and Health Maintenance accordingly    Last No results found for: HPV16  Last No results found for: HPV18  Last No results found for: HRHPV    Mammogram current: not applicable  Last Mammogram:   No results found.     Last Colonoscopy:  No results found for this or any previous visit.      HISTORY:  FLUoxetine (PROZAC) 20 MG capsule, Take 1 capsule (20 mg) by mouth daily  hydrOXYzine (ATARAX) 10 MG tablet, Take 1 tablet (10 mg) by mouth 3 times daily as needed for anxiety    No current facility-administered medications on " file prior to visit.     Allergies   Allergen Reactions     Penicillins      Immunization History   Administered Date(s) Administered     Influenza Vaccine IM > 6 months Valent IIV4 2019     TDAP Vaccine (Boostrix) 2019       OB History    Para Term  AB Living   0 0 0 0 0 0   SAB TAB Ectopic Multiple Live Births   0 0 0 0 0     No past medical history on file.  No past surgical history on file.  Family History   Problem Relation Age of Onset     Hypertension Mother      Thrombophilia Father         Genetic, on blood thinners. VTE     Diabetes Maternal Grandfather      Diabetes Paternal Grandmother      Social History     Socioeconomic History     Marital status:      Spouse name: None     Number of children: None     Years of education: None     Highest education level: None   Occupational History     None   Social Needs     Financial resource strain: None     Food insecurity:     Worry: None     Inability: None     Transportation needs:     Medical: None     Non-medical: None   Tobacco Use     Smoking status: Current Some Day Smoker     Smokeless tobacco: Never Used   Substance and Sexual Activity     Alcohol use: Yes     Drug use: Never     Sexual activity: Yes     Partners: Male     Birth control/protection: I.U.D.   Lifestyle     Physical activity:     Days per week: None     Minutes per session: None     Stress: None   Relationships     Social connections:     Talks on phone: None     Gets together: None     Attends Tenriism service: None     Active member of club or organization: None     Attends meetings of clubs or organizations: None     Relationship status: None     Intimate partner violence:     Fear of current or ex partner: None     Emotionally abused: None     Physically abused: None     Forced sexual activity: None   Other Topics Concern     None   Social History Narrative     None       ROS: 10 point ROS otherwise negative.    PHQ-9 SCORE 2019 10/30/2019  "11/4/2019   PHQ-9 Total Score 9 10 9     BOSTON-7 SCORE 9/18/2019 10/30/2019 11/4/2019   Total Score 10 5 6         EXAM:  Blood pressure (!) 131/98, pulse 92, height 1.753 m (5' 9\"), weight 97.5 kg (215 lb), last menstrual period 10/23/2019, not currently breastfeeding. Body mass index is 31.75 kg/m .  General - pleasant female in no acute distress.  Skin - no suspicious lesions or rashes  EENT-  PERRLA, OP clear  Neck - supple without lymphadenopathy.  Lungs - clear to auscultation bilaterally.  CV - regular rate and rhythm without murmur; no edema  Abdomen - soft, nontender, nondistended, no masses or organomegaly noted.  Musculoskeletal - no gross deformities  Neurological - normal strength, sensation, and mental status.    Breast Exam:  Breast: Without visible skin changes. No dimpling or lesions seen.   Breasts supple, non-tender with palpation, no dominant mass, nodularity, or nipple discharge noted bilaterally. Axillary nodes negative.      Pelvic Exam:  EG/BUS: Normal genital architecture without lesions, erythema or abnormal secretions Bartholin's, Urethra, [place]'s normal   Urethral meatus: normal   Urethra: no masses, tenderness, or scarring   Bladder: no masses or tenderness   Vagina: moist, pink, rugae with creamy, white and odorless  secretions  Cervix: no lesions and IUD strings extend 1 cm from external os.  Uterus: retroverted,   Adnexa: Within normal limits and No masses, nodularity, tenderness  Rectum:anus normal        ASSESSMENT:  Encounter Diagnosis   Name Primary?     Visit for preventive health examination Yes    Discussed the differences between Paraguard and Mirena and the lack of interaction between the progesterone in a Mirena and clotting disorders. Patient opted for the Mirena. Due for PAP with co-testing today.    Dr Padgett is following her for anxiety and possible hypertension. Picked up her 24h BP monitor today. She does have a family history of diabetes as well and may benefit from " baseline labs for kidney function and fasting glucose in the near future.    PLAN:   - Mirena IUD removed and re-placed  - PAP performed; cotesting pending    Return to clinic in one year.  Follow-up as needed.    Patient seen and examined with Dr Sherman.    Lurdes Gold MD  Internal Medicine PGY2  Pager: [phone]    I have seen and examined the patient with the resident. I have reviewed, edited, and agree with the note.   My findings are: as above  Sowmya Sherman M.D.      SUBJECTIVE:    Is a pregnancy test required: No.  Was a consent obtained?  Yes    Subjective: Cathleen Murray is a 34 year old  presents for IUD and desires Mirena type IUD.  She requests removal of the IUD because the IUD effectiveness has   She has had the Paragard for 10 years; she has a thrombophilia disorder but doesn't know which one and was told the paragard was better.  Apparently Dr. Padgett told her the Mirena was ok.  Patient has been given the opportunity to ask questions about all forms of birth control, including all options appropriate for Cathleen Murray.  Cathleen Murray understands she may have the IUD removed at any time. IUD should be removed by a health care provider and the current IUD will be removed today.    The entire removal and insertion procedure was reviewed with the patient, including care after placement.    Today's PHQ-2 Score:   PHQ-2 (  [employer]) 2019   Q1: Little interest or pleasure in doing things 1   Q2: Feeling down, depressed or hopeless 1   PHQ-2 Score 2       PROCEDURE:    Premedicated with ibuprofen. intracervical block at tenaculum site  A speculum exam was performed and the cervix was visualized. The IUD string was visualized. Using ring forceps, the string  was grasped and the IUD removed intact by the [facility] resident; Dr. Lurdes Gold.     I then proceeded for IUD insertion. Under sterile technique, cervix was visualized with speculum and prepped with Betadine solution swab x 3. Tenaculum was  placed for stability. The uterus was gently straightened and sounded to 7.0 cm. IUD prepared for placement, and IUD inserted according to 's instructions without difficulty or significant ressitance, and deployed at the fundus. The strings were visualized and trimmed to 3.0 cm from the external os. Tenaculum was removed and hemostasis noted. Speculum removed.  Patient tolerated procedure well.    Lot # [id number]  Exp: Jan 2022    EBL: minimal    Complications: none      POST PROCEDURE:    Given 's handouts, including when to have IUD removed, list of danger s/sx, side effects and follow up recommended.Advised to call for any fever, for prolonged or severe pain or bleeding, abnormal vaginal dischage, or unable to palpate strings. She was advised to use pain medications (ibuprofen) as needed for mild to moderate pain. Advised to follow-up in clinic in 8 weeks for IUD string check if unable to find strings or as directed by provider.     Sowmya Sherman MD

## 2019-11-04 NOTE — PROGRESS NOTES
SUBJECTIVE   Cathleen Murray is a 34 year old , Patient's last menstrual period was 10/23/2019., here for an annual preventive exam.    Specific concerns today:      Gynecologic History  Patient's last menstrual period was 10/23/2019.   Menstrual History:  Menstrual History 10/30/2019   LAST MENSTRUAL PERIOD 10/23/2019             Current contraception: {CONTRACEPTION:706}  Desires pregnancy within 12 months: Y/N  Number of partners in last year: {NUMBERS 1-16:10}  Denies history of STI  No results found for: PAP   History of abnormal Pap smear: {YES +++ /NO DEFAULT NO:430207}  HPV vaccine ***    Obstetric History  OB History    Para Term  AB Living   0 0 0 0 0 0   SAB TAB Ectopic Multiple Live Births   0 0 0 0 0        Health Maintenance  Immunization History   Administered Date(s) Administered     Influenza Vaccine IM > 6 months Valent IIV4 2019     TDAP Vaccine (Boostrix) 2019     Health Maintenance   Topic Date Due     PREVENTIVE CARE VISIT  1985     PNEUMOCOCCAL IMMUNIZATION 19-64 MEDIUM RISK (1 of 1 - PPSV23) 10/14/2004     HPV  10/14/2006     PAP  10/14/2010     PHQ-9  2020     DTAP/TDAP/TD IMMUNIZATION (2 - Td) 2029     HIV SCREENING  Completed     INFLUENZA VACCINE  Completed     IPV IMMUNIZATION  Aged Out     MENINGITIS IMMUNIZATION  Aged Out     No results found for: CHOL  No results found for: HDL  No results found for: LDL  Lab Results   Component Value Date    TSH 0.67 2019       Colonoscopy ***  Mammogram ***    Past Medical History  No past medical history on file.    Past Surgical History  No past surgical history on file.    Medications  Current Outpatient Medications   Medication     FLUoxetine (PROZAC) 20 MG capsule     hydrOXYzine (ATARAX) 10 MG tablet     No current facility-administered medications for this visit.        Allergies     Allergies   Allergen Reactions     Penicillins        Social History  Social History     Socioeconomic  "History     Marital status:      Spouse name: Not on file     Number of children: Not on file     Years of education: Not on file     Highest education level: Not on file   Occupational History     Not on file   Social Needs     Financial resource strain: Not on file     Food insecurity:     Worry: Not on file     Inability: Not on file     Transportation needs:     Medical: Not on file     Non-medical: Not on file   Tobacco Use     Smoking status: Current Some Day Smoker     Smokeless tobacco: Never Used   Substance and Sexual Activity     Alcohol use: Yes     Drug use: Never     Sexual activity: Yes     Partners: Male     Birth control/protection: I.U.D.   Lifestyle     Physical activity:     Days per week: Not on file     Minutes per session: Not on file     Stress: Not on file   Relationships     Social connections:     Talks on phone: Not on file     Gets together: Not on file     Attends Buddhist service: Not on file     Active member of club or organization: Not on file     Attends meetings of clubs or organizations: Not on file     Relationship status: Not on file     Intimate partner violence:     Fear of current or ex partner: Not on file     Emotionally abused: Not on file     Physically abused: Not on file     Forced sexual activity: Not on file   Other Topics Concern     Not on file   Social History Narrative     Not on file       Family History  Family History   Problem Relation Age of Onset     Hypertension Mother      Thrombophilia Father         Genetic, on blood thinners. VTE     Diabetes Maternal Grandfather      Diabetes Paternal Grandmother      No family history of breast, uterine, ovarian or colon cancer.    Review of Systems  {ROS NORMAL:288248::\"CONSTITUTIONAL: NEGATIVE for fever, chills\",\"EYES: NEGATIVE for vision changes \",\"RESP: NEGATIVE for significant cough or SOB\",\"CV: NEGATIVE for chest pain, palpitations \",\"GI: NEGATIVE for nausea, abdominal pain, heartburn, or change in " "bowel habits\",\": NEGATIVE for frequency, dysuria, or hematuria\",\"MUSCULOSKELETAL: NEGATIVE for significant arthralgias or myalgia\",\"NEURO: NEGATIVE for weakness, dizziness or paresthesias or headache\"}    OBJECTIVE   BP (!) 131/98   Pulse 92   Ht 1.753 m (5' 9\")   Wt 97.5 kg (215 lb)   LMP 10/23/2019   Breastfeeding? No   BMI 31.75 kg/m    Gen: NAD, resting comfortably  CV: well perfused, no LE edema  RR: normal respiratory rate and effort  Abd: soft, non-tender, non-distended ***    Pelvic:  Normal appearing external female genitalia. Normal hair distribution. Vagina is without lesions. *** discharge. Cervix ***parous, no lesions, no cervical motion tenderness. Uterus is small, mobile, non-tender. No adnexal tenderness or masses    ASSESSMENT   Cathleen Murray is a 34 year old , annual preventive exam within normal limits.    PLAN   No diagnosis found.    Age 13-18 Annual Preventive Exam  1.  Screening   Ng/Ct testing   HIV   Lipids - universal screening once 17-21 years    2.  Immunizations   Tdap between the ages of 11 years and 18 years, booster q 10 years   Hepatitis B if not previously immunized   HPV, age 9-26   Influenza yearly   MMR if not previously immunized   Meningococcal vaccine if not previously immunized; if first dose at age 13-15 years, repeat at age 16-18 years   Varicella if not previously immunized or history of disease    Age 19-39 Annual Preventive Exam  1.  Screening   Ng/Ct testing   HIV  Lipids - universal screening once 17-21 years   Cervical cancer   CBE    2.  Immunizations   Tdap q 10 years   HPV, age 26 or younger   Influenza yearly   MMR if not previously immunized   Varicella if not previously immunized or history of disease    Age 40-64 Annual Preventive Exam  1.  Screening   Cervical cancer   CBE and Mammography yearly   Colorectal cancer - colonoscopy q 10 (preferred), yearly FOBT, or Flex sig q 5   Diabetes - q 3 years 45+ (overweight or obese starting at age 40)   HIV " based on RF   Lipids - q 5 years 45+ (overweight or obese starting at age 40)   Thyroid - q 5 years 50+   Hepatitis C - once, age 50-75   Lung cancer - 55-80 if 30 pack year history and currently smoke or have quit <15 years ago    2.  Immunizations   Tdap q 10 years   Herpes zoster once starting at 60 years   Influenza yearly   MMR if not previously immunized   Varicella if not previously immunized or history of disease    3.  ASA for CVD prevention at 50 (10 year risk is 10% or greater), Statin use to prevent CVD at age 40 to 75 with one or more RF (dyslipidemia, DM, HTN, smoking) and calculated 10 year risk of CVD 10% or greater.    Age 65+ Annual Preventive Exam  1.  Screening   Cervical cancer if history of DOUG II-III   CBE and Mammography yearly   Colorectal cancer - colonoscopy q 10 (preferred), yearly FOBT, or Flex sig q 5   Diabetes - q 3 years 45+   HIV based on RF   Cholesterol and triglycerides - q 5 years 45+   Thyroid - q 5 years 50+   Hepatitis C - once, age 50-75   Lung cancer - 55-80 if 30 pack year history and currently smoke or have quit <15 years ago    2.  Immunizations   Tdap q 10 years   Herpes zoster once starting at 60 years   Influenza yearly   MMR if not previously immunized   Pneumococcal vaccine once   Varicella if not previously immunized or history of disease    3.  Patient Education   a.  Additional teaching done at this visit included: {TEACHING DONE MG CN:336959}   b.  Discussed with patient risks/ benefits and treatment options of prescribed medications or other treatment modalities.   c.  Recommended folate 0.4 - 0.8 mg daily for women of reproductive age    RTC in one year for annual exam or with concerns.     Staffed with  ***    Amber Voss MD MSc  OB/GYN PGY-1  11/04/19 1:14 PM

## 2019-11-04 NOTE — PROGRESS NOTES
"  Progress Note    SUBJECTIVE:  Cathleen Murray is an 34 year old, , who requests an Annual Preventive Exam.     She is due for her PAP. Last was about 5 years ago. One PAP with [facility] in her 20s with subsequent normal testing. No current concerns. No change in vaginal discharge. Monogamous with male partner of 5 years. Her periods are regular, every 30 days, lasting 5 days with two days of heavy bleeding (she has to change her menstrual cup twice on heavy days). Menarche was at 10.    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 She does not remember the name. No personal history of clots. Her father had a PE and the genetic abnormality was found on work-up; she was subsequently screening. As a result, she had a Paraguard placed (provider was uncomfortable with hormonal contraception), which is now 10 years old. She is interested in getting a Mirena, in hopes of a lighter period. No plans to have children.    Menstrual History:  Menstrual History 10/30/2019   LAST MENSTRUAL PERIOD 10/23/2019       Last  No results found for: PAP  History of abnormal Pap smear: YES - updated in Problem List and Health Maintenance accordingly    Last No results found for: HPV16  Last No results found for: HPV18  Last No results found for: HRHPV    Mammogram current: not applicable  Last Mammogram:   No results found.     Last Colonoscopy:  No results found for this or any previous visit.      HISTORY:  FLUoxetine (PROZAC) 20 MG capsule, Take 1 capsule (20 mg) by mouth daily  hydrOXYzine (ATARAX) 10 MG tablet, Take 1 tablet (10 mg) by mouth 3 times daily as needed for anxiety    No current facility-administered medications on file prior to visit.     Allergies   Allergen Reactions     Penicillins      Immunization History   Administered Date(s) Administered     Influenza Vaccine IM > 6 months Valent IIV4 2019     TDAP Vaccine (Boostrix) 2019       OB History    Para Term  AB " "Living   0 0 0 0 0 0   SAB TAB Ectopic Multiple Live Births   0 0 0 0 0     No past medical history on file.  No past surgical history on file.  Family History   Problem Relation Age of Onset     Hypertension Mother      Thrombophilia Father         Genetic, on blood thinners. VTE     Diabetes Maternal Grandfather      Diabetes Paternal Grandmother      Social History     Socioeconomic History     Marital status:      Spouse name: None     Number of children: None     Years of education: None     Highest education level: None   Occupational History     None   Social Needs     Financial resource strain: None     Food insecurity:     Worry: None     Inability: None     Transportation needs:     Medical: None     Non-medical: None   Tobacco Use     Smoking status: Current Some Day Smoker     Smokeless tobacco: Never Used   Substance and Sexual Activity     Alcohol use: Yes     Drug use: Never     Sexual activity: Yes     Partners: Male     Birth control/protection: I.U.D.   Lifestyle     Physical activity:     Days per week: None     Minutes per session: None     Stress: None   Relationships     Social connections:     Talks on phone: None     Gets together: None     Attends Yazidi service: None     Active member of club or organization: None     Attends meetings of clubs or organizations: None     Relationship status: None     Intimate partner violence:     Fear of current or ex partner: None     Emotionally abused: None     Physically abused: None     Forced sexual activity: None   Other Topics Concern     None   Social History Narrative     None       ROS: 10 point ROS otherwise negative.    PHQ-9 SCORE 9/18/2019 10/30/2019 11/4/2019   PHQ-9 Total Score 9 10 9     BOSTON-7 SCORE 9/18/2019 10/30/2019 11/4/2019   Total Score 10 5 6         EXAM:  Blood pressure (!) 131/98, pulse 92, height 1.753 m (5' 9\"), weight 97.5 kg (215 lb), last menstrual period 10/23/2019, not currently breastfeeding. Body mass index is " 31.75 kg/m .  General - pleasant female in no acute distress.  Skin - no suspicious lesions or rashes  EENT-  PERRLA, OP clear  Neck - supple without lymphadenopathy.  Lungs - clear to auscultation bilaterally.  CV - regular rate and rhythm without murmur; no edema  Abdomen - soft, nontender, nondistended, no masses or organomegaly noted.  Musculoskeletal - no gross deformities  Neurological - normal strength, sensation, and mental status.    Breast Exam:  Breast: Without visible skin changes. No dimpling or lesions seen.   Breasts supple, non-tender with palpation, no dominant mass, nodularity, or nipple discharge noted bilaterally. Axillary nodes negative.      Pelvic Exam:  EG/BUS: Normal genital architecture without lesions, erythema or abnormal secretions Bartholin's, Urethra, [place]'s normal   Urethral meatus: normal   Urethra: no masses, tenderness, or scarring   Bladder: no masses or tenderness   Vagina: moist, pink, rugae with creamy, white and odorless  secretions  Cervix: no lesions and IUD strings extend 1 cm from external os.  Uterus: retroverted,   Adnexa: Within normal limits and No masses, nodularity, tenderness  Rectum:anus normal        ASSESSMENT:  Encounter Diagnosis   Name Primary?     Visit for preventive health examination Yes    Discussed the differences between Paraguard and Mirena and the lack of interaction between the progesterone in a Mirena and clotting disorders. Patient opted for the Mirena. Due for PAP with co-testing today.    Dr Padgett is following her for anxiety and possible hypertension. Picked up her 24h BP monitor today. She does have a family history of diabetes as well and may benefit from baseline labs for kidney function and fasting glucose in the near future.    PLAN:   - Mirena IUD removed and re-placed  - PAP performed; cotesting pending    Return to clinic in one year.  Follow-up as needed.    Patient seen and examined with Dr Sherman.    Lurdes Gold MD  Internal  Medicine PGY2  Pager: [phone]    I have seen and examined the patient with the resident. I have reviewed, edited, and agree with the note.   My findings are: as above  Sowmya Sherman M.D.

## 2019-11-05 ASSESSMENT — ANXIETY QUESTIONNAIRES: GAD7 TOTAL SCORE: 6

## 2019-11-08 LAB
COPATH REPORT: NORMAL
PAP: NORMAL

## 2019-11-11 LAB
FINAL DIAGNOSIS: NORMAL
HPV HR 12 DNA CVX QL NAA+PROBE: NEGATIVE
HPV16 DNA SPEC QL NAA+PROBE: NEGATIVE
HPV18 DNA SPEC QL NAA+PROBE: NEGATIVE
SPECIMEN DESCRIPTION: NORMAL
SPECIMEN SOURCE CVX/VAG CYTO: NORMAL

## 2019-11-27 ENCOUNTER — OFFICE VISIT (OUTPATIENT)
Dept: INTERNAL MEDICINE | Facility: CLINIC | Age: 34
End: 2019-11-27
Attending: INTERNAL MEDICINE
Payer: COMMERCIAL

## 2019-11-27 VITALS
WEIGHT: 216 LBS | BODY MASS INDEX: 31.9 KG/M2 | HEART RATE: 82 BPM | DIASTOLIC BLOOD PRESSURE: 95 MMHG | SYSTOLIC BLOOD PRESSURE: 132 MMHG

## 2019-11-27 DIAGNOSIS — I10 ESSENTIAL HYPERTENSION: Primary | ICD-10-CM

## 2019-11-27 PROCEDURE — G0463 HOSPITAL OUTPT CLINIC VISIT: HCPCS | Mod: ZF

## 2019-11-27 RX ORDER — LOSARTAN POTASSIUM 50 MG/1
50 TABLET ORAL DAILY
Qty: 30 TABLET | Refills: 3 | Status: SHIPPED | OUTPATIENT
Start: 2019-11-27 | End: 2019-12-10 | Stop reason: DRUGHIGH

## 2019-11-27 ASSESSMENT — PAIN SCALES - GENERAL: PAINLEVEL: NO PAIN (0)

## 2019-11-27 NOTE — PROGRESS NOTES
HPI  Patient is here for follow up on hypertension. She reports that her mother had a significant adverse reaction to lisinopril, would like to avid this medication.     Review of Systems     Constitutional:  Negative for chills and fatigue.   Eyes:  Negative for decreased vision and eye watering.   Respiratory:   Negative for cough and dyspnea on exertion.    Cardiovascular:  Negative for chest pain, dyspnea on exertion and edema.   Gastrointestinal:  Negative for nausea, vomiting, abdominal pain, diarrhea and constipation.   Skin:  Negative for itching and rash.   Endo/Heme:  Negative for anemia, swollen glands and bruises/bleeds easily.   Psychiatric/Behavioral:  Negative for depression, decreased concentration, mood swings and panic attacks.    Endocrine:  Negative for altered temperature regulation, polyphagia, polydipsia, unwanted hair growth and change in facial hair.       Current Outpatient Medications   Medication     FLUoxetine (PROZAC) 20 MG capsule     hydrOXYzine (ATARAX) 10 MG tablet     levonorgestrel (MIRENA) 20 MCG/24HR IUD     No current facility-administered medications for this visit.      Vitals:    11/27/19 1345 11/27/19 1349 11/27/19 1350   BP: (!) 123/93 (!) 133/97 (!) 132/95   Pulse: 83 86 82   Weight: 98 kg (216 lb)         Physical Exam  Vitals signs and nursing note reviewed.   Constitutional:       Appearance: Normal appearance.   HENT:      Head: Normocephalic and atraumatic.      Mouth/Throat:      Mouth: Mucous membranes are moist.   Eyes:      Pupils: Pupils are equal, round, and reactive to light.   Neck:      Musculoskeletal: Normal range of motion and neck supple.   Cardiovascular:      Rate and Rhythm: Normal rate and regular rhythm.   Pulmonary:      Effort: Pulmonary effort is normal.      Breath sounds: Normal breath sounds.   Musculoskeletal:         General: No edema.   Neurological:      Mental Status: She is alert.   Psychiatric:         Mood and Affect: Mood normal.          Behavior: Behavior normal.         Thought Content: Thought content normal.         Judgment: Judgment normal.         Assessment and Plan:  Cathleen was seen today for follow up.    Diagnoses and all orders for this visit:    Essential hypertension. Blood pressure is elevated today. Recommend starting losartan. Patient was advised to follow up in 2-3 weeks for blood pressure check and recheck of kidney function and electrolytes.   -     losartan (COZAAR) 50 MG tablet; Take 1 tablet (50 mg) by mouth daily  -     Cancel: Basic Metabolic Panel  -     Basic metabolic panel  (Ca, Cl, CO2, Creat, Gluc, K, Na, BUN); Future      Total time spent 25 minutes.  More than 50% of the time spent with Ms. Murray on counseling / coordinating her care    Ashley Padgett MD

## 2019-11-27 NOTE — LETTER
11/27/2019       RE: Cathleen Murray  [address] [address]     Dear Colleague,    Thank you for referring your patient, Cathleen Murray, to the [facility]  at [facility]. Please see a copy of my visit note below.    HPI  Patient is here for follow up on hypertension. She reports that her mother had a significant adverse reaction to lisinopril, would like to avid this medication.     Review of Systems     Constitutional:  Negative for chills and fatigue.   Eyes:  Negative for decreased vision and eye watering.   Respiratory:   Negative for cough and dyspnea on exertion.    Cardiovascular:  Negative for chest pain, dyspnea on exertion and edema.   Gastrointestinal:  Negative for nausea, vomiting, abdominal pain, diarrhea and constipation.   Skin:  Negative for itching and rash.   Endo/Heme:  Negative for anemia, swollen glands and bruises/bleeds easily.   Psychiatric/Behavioral:  Negative for depression, decreased concentration, mood swings and panic attacks.    Endocrine:  Negative for altered temperature regulation, polyphagia, polydipsia, unwanted hair growth and change in facial hair.       Current Outpatient Medications   Medication     FLUoxetine (PROZAC) 20 MG capsule     hydrOXYzine (ATARAX) 10 MG tablet     levonorgestrel (MIRENA) 20 MCG/24HR IUD     No current facility-administered medications for this visit.      Vitals:    11/27/19 1345 11/27/19 1349 11/27/19 1350   BP: (!) 123/93 (!) 133/97 (!) 132/95   Pulse: 83 86 82   Weight: 98 kg (216 lb)         Physical Exam  Vitals signs and nursing note reviewed.   Constitutional:       Appearance: Normal appearance.   HENT:      Head: Normocephalic and atraumatic.      Mouth/Throat:      Mouth: Mucous membranes are moist.   Eyes:      Pupils: Pupils are equal, round, and reactive to light.   Neck:      Musculoskeletal: Normal range of motion and neck supple.   Cardiovascular:      Rate and  Will need IV hydrocortisone 50 mg IV Q 8 hours with 1st dose on call to OR. POD 1 can take 25 mg PO TID. POD 2 can take 25 BID. POD 3 can resume her usual dose.   If for some reason not taking PO POD 1, can give the PO dose as IV.   Should call if has N/V, lightheaded, orthostatic.     I will CC koki so she is aware of steroid regimen to adjust insulin       Rhythm: Normal rate and regular rhythm.   Pulmonary:      Effort: Pulmonary effort is normal.      Breath sounds: Normal breath sounds.   Musculoskeletal:         General: No edema.   Neurological:      Mental Status: She is alert.   Psychiatric:         Mood and Affect: Mood normal.         Behavior: Behavior normal.         Thought Content: Thought content normal.         Judgment: Judgment normal.         Assessment and Plan:  Cathleen was seen today for follow up.    Diagnoses and all orders for this visit:    Essential hypertension. Blood pressure is elevated today. Recommend starting losartan. Patient was advised to follow up in 2-3 weeks for blood pressure check and recheck of kidney function and electrolytes.   -     losartan (COZAAR) 50 MG tablet; Take 1 tablet (50 mg) by mouth daily  -     Cancel: Basic Metabolic Panel  -     Basic metabolic panel  (Ca, Cl, CO2, Creat, Gluc, K, Na, BUN); Future      Total time spent 25 minutes.  More than 50% of the time spent with Ms. Murray on counseling / coordinating her care    Ashley Padgett MD

## 2019-12-02 ASSESSMENT — ENCOUNTER SYMPTOMS
NAUSEA: 0
INSOMNIA: 0
DEPRESSION: 0
DYSPNEA ON EXERTION: 0
CHILLS: 0
NERVOUS/ANXIOUS: 0
VOMITING: 0
SWOLLEN GLANDS: 0
COUGH: 0
EYE WATERING: 0
ALTERED TEMPERATURE REGULATION: 0
PANIC: 0
POLYPHAGIA: 0
CONSTIPATION: 0
FATIGUE: 0
DIARRHEA: 0
DECREASED CONCENTRATION: 0
POLYDIPSIA: 0
ABDOMINAL PAIN: 0
BRUISES/BLEEDS EASILY: 0

## 2019-12-10 ENCOUNTER — OFFICE VISIT (OUTPATIENT)
Dept: PHARMACY | Facility: CLINIC | Age: 34
End: 2019-12-10
Payer: COMMERCIAL

## 2019-12-10 ENCOUNTER — HOSPITAL ENCOUNTER (OUTPATIENT)
Facility: CLINIC | Age: 34
Setting detail: SPECIMEN
Discharge: HOME | End: 2019-12-10
Admitting: INTERNAL MEDICINE
Payer: COMMERCIAL

## 2019-12-10 VITALS — DIASTOLIC BLOOD PRESSURE: 88 MMHG | SYSTOLIC BLOOD PRESSURE: 125 MMHG | HEART RATE: 82 BPM

## 2019-12-10 DIAGNOSIS — F41.1 GENERALIZED ANXIETY DISORDER: Primary | ICD-10-CM

## 2019-12-10 DIAGNOSIS — I10 ESSENTIAL HYPERTENSION: ICD-10-CM

## 2019-12-10 LAB
ANION GAP SERPL CALCULATED.3IONS-SCNC: 6 MMOL/L (ref 3–14)
BUN SERPL-MCNC: 8 MG/DL (ref 7–30)
CALCIUM SERPL-MCNC: 8.2 MG/DL (ref 8.5–10.1)
CHLORIDE SERPL-SCNC: 109 MMOL/L (ref 94–109)
CO2 SERPL-SCNC: 23 MMOL/L (ref 20–32)
CREAT SERPL-MCNC: 0.57 MG/DL (ref 0.52–1.04)
GFR SERPL CREATININE-BSD FRML MDRD: >90 ML/MIN/{1.73_M2}
GLUCOSE SERPL-MCNC: 102 MG/DL (ref 70–99)
POTASSIUM SERPL-SCNC: 3.9 MMOL/L (ref 3.4–5.3)
SODIUM SERPL-SCNC: 138 MMOL/L (ref 133–144)

## 2019-12-10 PROCEDURE — 36415 COLL VENOUS BLD VENIPUNCTURE: CPT | Performed by: INTERNAL MEDICINE

## 2019-12-10 PROCEDURE — 99605 MTMS BY PHARM NP 15 MIN: CPT | Performed by: PHARMACIST

## 2019-12-10 PROCEDURE — 99607 MTMS BY PHARM ADDL 15 MIN: CPT | Performed by: PHARMACIST

## 2019-12-10 PROCEDURE — 80048 BASIC METABOLIC PNL TOTAL CA: CPT | Performed by: INTERNAL MEDICINE

## 2019-12-10 RX ORDER — FLUOXETINE 40 MG/1
40 CAPSULE ORAL DAILY
Qty: 90 CAPSULE | Refills: 3 | Status: SHIPPED | OUTPATIENT
Start: 2019-12-10

## 2019-12-10 RX ORDER — LOSARTAN POTASSIUM 100 MG/1
100 TABLET ORAL DAILY
Qty: 90 TABLET | Refills: 3 | Status: SHIPPED | OUTPATIENT
Start: 2019-12-10

## 2019-12-10 ASSESSMENT — ANXIETY QUESTIONNAIRES
GAD7 TOTAL SCORE: 7
3. WORRYING TOO MUCH ABOUT DIFFERENT THINGS: SEVERAL DAYS
6. BECOMING EASILY ANNOYED OR IRRITABLE: SEVERAL DAYS
1. FEELING NERVOUS, ANXIOUS, OR ON EDGE: MORE THAN HALF THE DAYS
5. BEING SO RESTLESS THAT IT IS HARD TO SIT STILL: NOT AT ALL
2. NOT BEING ABLE TO STOP OR CONTROL WORRYING: SEVERAL DAYS
7. FEELING AFRAID AS IF SOMETHING AWFUL MIGHT HAPPEN: NOT AT ALL

## 2019-12-10 ASSESSMENT — PATIENT HEALTH QUESTIONNAIRE - PHQ9
SUM OF ALL RESPONSES TO PHQ QUESTIONS 1-9: 8
5. POOR APPETITE OR OVEREATING: MORE THAN HALF THE DAYS

## 2019-12-10 NOTE — PATIENT INSTRUCTIONS
Recommendations from today's MTM visit:                                                    MTM (medication therapy management) is a service provided by a clinical pharmacist designed to help you get the most of out of your medicines.     1.  For your anxiety and depression, we will increase the fluoxetine to 40 mg once daily.  We encourage you to start seeing a therapist again to help with your anxiety.      2.  Your blood pressure is almost to goal, but we'd like it to be less than 130/80.  We will increase the losartan to 100 mg once daily.        It was great to speak with you today.  I value your experience and would be very thankful for your time with providing feedback on our clinic survey. You may receive a survey via email or text message in the next few days.     Next visit:  See Dr. Padgett or Dr. Coronado in 1 month for mood and BP follow-up.    To schedule another MTM appointment, please call the clinic directly or you may call the MTM scheduling line at [phone] or toll-free at [phone].     My Clinical Pharmacist's contact information:                                                      It was a pleasure talking with you today!  Please feel free to contact me with any questions or concerns you have.      Lurdes Coronado, Pharm.D., [facility]  Phone:  [phone]

## 2019-12-10 NOTE — PROGRESS NOTES
SUBJECTIVE/OBJECTIVE:                           Cathleen Murray is a 34 year old female seen for an initial visit for Medication Therapy Management.  She was referred to me from Dr. Padgett.    Chief Complaint: essential hypertension and depression/anxiety.    Medication Adherence/Access:  no issues reported    Depression/anxiety: Currently taking fluoxetine which she thinks has helped with her symptoms but is still finding herself with a lack of motivation.  She is currently not seeing a therapist due to busy schedule but plans to look into it as she states it had helped in the past.  Also currently on hydroxyzine as needed.  Has used hydroxyzine more in the recent past- about once to twice daily for the past 5 days (when previously she used it maybe twice weekly).  She states that she is not sure if this is working as well as she would hope, but that it just makes her tired. Symptoms of her anxiety include heart beating fast and chest tightness and she states it most often happens when she is trying to relax (not when she is busy).  She has recently started working a second job which she states may be contributing to the increased anxiety. She has had two panic attacks in the past 3 weeks, one of which she had to leave work as a result.       BOSTON-7 SCORE 10/30/2019 11/4/2019 12/10/2019   Total Score 5 6 7     PHQ-9 SCORE 10/30/2019 11/4/2019 12/10/2019   PHQ-9 Total Score 10 9 8     Essential hypertension: Cathleen states that she has not experienced any changes since starting her losartan 50 mg daily. She denies symptoms of hypotension.     Today's Vitals: /89 Pulse 83, repeat: /88   Pulse 82      ASSESSMENT:                              Medication Adherence: good, no issues identified    Depression/anxiety: GAD7 score has increased although there are outside stressors to attribute this increase to.  Options for alternative prn medications for anxiety were discussed as Cathleen is really looking for something  that will not have a sedating effect.  It was decided against a benzodiazapine therapy as it poses a risk of dependence. With her lack of motivation being a contributing factor to her stress, an increase in her fluoxetine may be helpful.  With the possible stimulating effects of this serotonin specific reuptake inhibitor, future options for anxiety control could include changing serotonin specific reuptake inhibitor or using a beta blocker to control anxiety specific symptoms.  With reported success in the past, therapy should be highly encouraged.     Essential hypertension: Both readings in clinic today have diastolic readings above goal of <130/80 mmHg.  It has been >2 weeks since she started losartan so she is due for a BMP.  Pending BMP results, an increase in antihypertensive dose is indicated.  Cathleen has not experienced any symptoms of hypotension so keeping the dose as once daily to help with convenience is reasonable.        PLAN:                              -pending BMP results, increase losartan to 100 mg daily. Educate patient about symptoms of hypotension  -Encourage patient to start seeing a therapist again   - increase fluoxetine dose to 40 mg daily    I spent 30 minutes with this patient today. All changes were made via collaborative practice agreement with Dr. Padgett. A copy of the visit note was provided to the patient's primary care provider.    Will follow up in one month with either Dr. Padgett or Dr. Coronado for mood and BP follow-up.    The patient was given a summary of these recommendations as an after visit summary.     Thank you for the opportunity to participate in the care of this patient.   Linda Morris Pharm IV on 12/10/2019 at 12:41 PM      I was present with the student during the assessment and I approve of the above plan and documentation.      Lurdes Coronado, Pharm.D., BCPS

## 2019-12-11 ASSESSMENT — ANXIETY QUESTIONNAIRES: GAD7 TOTAL SCORE: 7

## 2019-12-16 ENCOUNTER — OFFICE VISIT (OUTPATIENT)
Dept: OBGYN | Facility: CLINIC | Age: 34
End: 2019-12-16
Attending: OBSTETRICS & GYNECOLOGY
Payer: COMMERCIAL

## 2019-12-16 VITALS
BODY MASS INDEX: 31.75 KG/M2 | HEART RATE: 74 BPM | SYSTOLIC BLOOD PRESSURE: 118 MMHG | DIASTOLIC BLOOD PRESSURE: 86 MMHG | WEIGHT: 215 LBS

## 2019-12-16 DIAGNOSIS — Z30.431 IUD CHECK UP: Primary | ICD-10-CM

## 2019-12-16 PROCEDURE — G0463 HOSPITAL OUTPT CLINIC VISIT: HCPCS | Mod: ZF

## 2019-12-16 ASSESSMENT — ANXIETY QUESTIONNAIRES
7. FEELING AFRAID AS IF SOMETHING AWFUL MIGHT HAPPEN: NOT AT ALL
GAD7 TOTAL SCORE: 6
3. WORRYING TOO MUCH ABOUT DIFFERENT THINGS: SEVERAL DAYS
6. BECOMING EASILY ANNOYED OR IRRITABLE: SEVERAL DAYS
2. NOT BEING ABLE TO STOP OR CONTROL WORRYING: SEVERAL DAYS
1. FEELING NERVOUS, ANXIOUS, OR ON EDGE: MORE THAN HALF THE DAYS
5. BEING SO RESTLESS THAT IT IS HARD TO SIT STILL: NOT AT ALL

## 2019-12-16 ASSESSMENT — PATIENT HEALTH QUESTIONNAIRE - PHQ9
SUM OF ALL RESPONSES TO PHQ QUESTIONS 1-9: 7
5. POOR APPETITE OR OVEREATING: SEVERAL DAYS

## 2019-12-16 ASSESSMENT — PAIN SCALES - GENERAL: PAINLEVEL: NO PAIN (0)

## 2019-12-16 NOTE — LETTER
2019       RE: Cathleen Murray  [address] [facility] [id number]     Dear Colleague,    Thank you for referring your patient, Cathleen Murray, to the [facility] at [facility]. Please see a copy of my visit note below.    Gyn Clinic Visit Note  2019    S: Cathleen Murray is a 34 year old  here today for IUD check. Mirena was placed 2019.  She is happy so far; she has had 2 light menses.  No c/o of abnormal discharge; LAP, fever.     O:   /86   Pulse 74   Wt 97.5 kg (215 lb)   Breastfeeding No   BMI 31.75 kg/m     Pelvic exam: normal vagina and vulva, normal cervix without lesions; bloody/mucous discharge present.    A:  Cathleen Murray is a 34 year old y/o  here today for IUD check..     P: Doing well. RTO prn.    Sowmya Sherman MD

## 2019-12-16 NOTE — PROGRESS NOTES
Gyn Clinic Visit Note  2019    S: Cathleen Murray is a 34 year old  here today for IUD check. Mirena was placed 2019.  She is happy so far; she has had 2 light menses.  No c/o of abnormal discharge; LAP, fever.     O:   /86   Pulse 74   Wt 97.5 kg (215 lb)   Breastfeeding No   BMI 31.75 kg/m    Pelvic exam: normal vagina and vulva, normal cervix without lesions; bloody/mucous discharge present.    A:  Cathleen Murray is a 34 year old y/o  here today for IUD check..     P: Doing well. RTO prn.

## 2019-12-17 ASSESSMENT — ANXIETY QUESTIONNAIRES: GAD7 TOTAL SCORE: 6

## 2020-06-03 ENCOUNTER — TELEPHONE (OUTPATIENT)
Dept: OBGYN | Facility: CLINIC | Age: 35
End: 2020-06-03

## 2020-06-03 NOTE — TELEPHONE ENCOUNTER
Telephone call from [facility] wanting  New scripts as gong to transfer to this pharmacy./  Called and instructed to please reach out to pharmacy to transfer prescriptions.  They said they will do so

## 2021-01-03 ENCOUNTER — HEALTH MAINTENANCE LETTER (OUTPATIENT)
Age: 36
End: 2021-01-03

## 2021-10-10 ENCOUNTER — HEALTH MAINTENANCE LETTER (OUTPATIENT)
Age: 36
End: 2021-10-10

## 2022-01-29 ENCOUNTER — HEALTH MAINTENANCE LETTER (OUTPATIENT)
Age: 37
End: 2022-01-29

## 2022-09-18 ENCOUNTER — HEALTH MAINTENANCE LETTER (OUTPATIENT)
Age: 37
End: 2022-09-18

## 2023-05-07 ENCOUNTER — HEALTH MAINTENANCE LETTER (OUTPATIENT)
Age: 38
End: 2023-05-07